# Patient Record
Sex: MALE | Race: WHITE | NOT HISPANIC OR LATINO | URBAN - METROPOLITAN AREA
[De-identification: names, ages, dates, MRNs, and addresses within clinical notes are randomized per-mention and may not be internally consistent; named-entity substitution may affect disease eponyms.]

---

## 2017-08-14 ENCOUNTER — APPOINTMENT (RX ONLY)
Dept: URBAN - METROPOLITAN AREA CLINIC 17 | Facility: CLINIC | Age: 47
Setting detail: DERMATOLOGY
End: 2017-08-14

## 2017-08-14 DIAGNOSIS — L40.0 PSORIASIS VULGARIS: ICD-10-CM | Status: STABLE

## 2017-08-14 PROCEDURE — 99213 OFFICE O/P EST LOW 20 MIN: CPT

## 2017-08-14 PROCEDURE — ? COUNSELING

## 2017-08-14 PROCEDURE — ? TREATMENT REGIMEN

## 2017-08-14 PROCEDURE — ? PRESCRIPTION

## 2017-08-14 RX ORDER — HYDROCORTISONE BUTYRATE 1 MG/G
1 OINTMENT TOPICAL BID
Qty: 1 | Refills: 2 | Status: ERX | COMMUNITY
Start: 2017-08-14

## 2017-08-14 RX ORDER — CLOBETASOL PROPIONATE 0.5 MG/G
1 OINTMENT TOPICAL BID
Qty: 1 | Refills: 6 | Status: ERX

## 2017-08-14 RX ORDER — CLOBETASOL PROPIONATE 0.5 MG/ML
1 SOLUTION TOPICAL BID
Qty: 1 | Refills: 6 | Status: ERX

## 2017-08-14 RX ADMIN — HYDROCORTISONE BUTYRATE 1: 1 OINTMENT TOPICAL at 00:00

## 2017-08-14 ASSESSMENT — BSA PSORIASIS: % BODY COVERED IN PSORIASIS: 2

## 2017-08-14 ASSESSMENT — LOCATION ZONE DERM: LOCATION ZONE: ARM

## 2017-08-14 ASSESSMENT — LOCATION DETAILED DESCRIPTION DERM
LOCATION DETAILED: RIGHT ELBOW
LOCATION DETAILED: LEFT ELBOW

## 2017-08-14 ASSESSMENT — LOCATION SIMPLE DESCRIPTION DERM
LOCATION SIMPLE: LEFT ELBOW
LOCATION SIMPLE: RIGHT ELBOW

## 2017-08-14 NOTE — PROCEDURE: TREATMENT REGIMEN
Detail Level: Zone
Action 3: Continue
Continue Regimen: Clobetasol solution for scalp flares and ointment for body flares. Hydrocortisone if needed for axilla

## 2018-12-11 ENCOUNTER — APPOINTMENT (RX ONLY)
Dept: URBAN - METROPOLITAN AREA CLINIC 41 | Facility: CLINIC | Age: 48
Setting detail: DERMATOLOGY
End: 2018-12-11

## 2018-12-11 DIAGNOSIS — L40.0 PSORIASIS VULGARIS: ICD-10-CM | Status: STABLE

## 2018-12-11 DIAGNOSIS — Z71.89 OTHER SPECIFIED COUNSELING: ICD-10-CM

## 2018-12-11 PROCEDURE — ? PRESCRIPTION

## 2018-12-11 PROCEDURE — ? COUNSELING

## 2018-12-11 PROCEDURE — ? TREATMENT REGIMEN

## 2018-12-11 PROCEDURE — 99213 OFFICE O/P EST LOW 20 MIN: CPT

## 2018-12-11 RX ORDER — CLOBETASOL PROPIONATE 0.5 MG/G
1 OINTMENT TOPICAL BID
Qty: 1 | Refills: 6 | Status: ERX

## 2018-12-11 RX ORDER — HYDROCORTISONE BUTYRATE 1 MG/G
1 OINTMENT TOPICAL BID
Qty: 1 | Refills: 2 | Status: ERX

## 2018-12-11 RX ORDER — CLOBETASOL PROPIONATE 0.5 MG/ML
1 SOLUTION TOPICAL BID
Qty: 1 | Refills: 6 | Status: ERX

## 2018-12-11 ASSESSMENT — LOCATION ZONE DERM
LOCATION ZONE: HAND
LOCATION ZONE: SCALP
LOCATION ZONE: ARM

## 2018-12-11 ASSESSMENT — LOCATION DETAILED DESCRIPTION DERM
LOCATION DETAILED: LEFT SUPERIOR PARIETAL SCALP
LOCATION DETAILED: RIGHT RADIAL PALM
LOCATION DETAILED: LEFT THENAR EMINENCE
LOCATION DETAILED: LEFT ELBOW
LOCATION DETAILED: RIGHT ELBOW

## 2018-12-11 ASSESSMENT — LOCATION SIMPLE DESCRIPTION DERM
LOCATION SIMPLE: SCALP
LOCATION SIMPLE: LEFT HAND
LOCATION SIMPLE: LEFT ELBOW
LOCATION SIMPLE: RIGHT ELBOW
LOCATION SIMPLE: RIGHT HAND

## 2018-12-11 NOTE — PROCEDURE: TREATMENT REGIMEN
Action 3: Continue
Continue Regimen: Clobetasol solution for scalp flares and ointment for body flares. Hydrocortisone if needed for axilla
Detail Level: Zone
Detail Level: Simple
Otc Regimen: Cetaphil, CeraVe or Gold Bond cream for eczema

## 2018-12-11 NOTE — HPI: RASH
What Type Of Note Output Would You Prefer (Optional)?: Standard Output
How Severe Is Your Rash?: moderate
Is This A New Presentation, Or A Follow-Up?: Rash
Additional History: Patient states he is here for follow up of his psoriasis on his scalp and right forearm, present for several years and moderate in severity.

## 2020-01-09 ENCOUNTER — RX ONLY (OUTPATIENT)
Age: 50
Setting detail: RX ONLY
End: 2020-01-09

## 2020-01-09 RX ORDER — CLOBETASOL PROPIONATE 0.5 MG/ML
1 SOLUTION TOPICAL BID
Qty: 1 | Refills: 0 | Status: ERX | COMMUNITY
Start: 2020-01-09

## 2020-10-09 ENCOUNTER — RX ONLY (OUTPATIENT)
Age: 50
Setting detail: RX ONLY
End: 2020-10-09

## 2020-10-09 RX ORDER — CLOBETASOL PROPIONATE 0.5 MG/G
1 OINTMENT TOPICAL BID
Qty: 1 | Refills: 0 | Status: ERX | COMMUNITY
Start: 2020-10-09

## 2020-10-09 RX ORDER — CLOBETASOL PROPIONATE 0.5 MG/ML
1 SOLUTION TOPICAL BID
Qty: 1 | Refills: 0 | Status: ERX

## 2020-11-24 ENCOUNTER — APPOINTMENT (RX ONLY)
Dept: URBAN - METROPOLITAN AREA CLINIC 17 | Facility: CLINIC | Age: 50
Setting detail: DERMATOLOGY
End: 2020-11-24

## 2020-11-24 VITALS — TEMPERATURE: 96.3 F

## 2020-11-24 DIAGNOSIS — L40.0 PSORIASIS VULGARIS: ICD-10-CM

## 2020-11-24 PROCEDURE — ? COUNSELING

## 2020-11-24 PROCEDURE — 99213 OFFICE O/P EST LOW 20 MIN: CPT

## 2020-11-24 PROCEDURE — ? PRESCRIPTION

## 2020-11-24 RX ORDER — CLOBETASOL PROPIONATE 0.5 MG/ML
SOLUTION TOPICAL QD
Qty: 1 | Refills: 6 | Status: ERX

## 2020-11-24 RX ORDER — CLOBETASOL PROPIONATE 0.5 MG/G
OINTMENT TOPICAL BID
Qty: 1 | Refills: 4 | Status: ERX

## 2020-11-24 RX ORDER — HYDROCORTISONE 10 MG/G
OINTMENT TOPICAL BID
Qty: 1 | Refills: 2 | Status: ERX | COMMUNITY
Start: 2020-11-24

## 2020-11-24 RX ADMIN — HYDROCORTISONE 1: 10 OINTMENT TOPICAL at 00:00

## 2020-11-24 ASSESSMENT — LOCATION DETAILED DESCRIPTION DERM
LOCATION DETAILED: LEFT PROXIMAL DORSAL FOREARM
LOCATION DETAILED: RIGHT KNEE
LOCATION DETAILED: RIGHT PROXIMAL DORSAL FOREARM
LOCATION DETAILED: LEFT KNEE

## 2020-11-24 ASSESSMENT — LOCATION ZONE DERM
LOCATION ZONE: LEG
LOCATION ZONE: ARM

## 2020-11-24 ASSESSMENT — LOCATION SIMPLE DESCRIPTION DERM
LOCATION SIMPLE: LEFT KNEE
LOCATION SIMPLE: LEFT FOREARM
LOCATION SIMPLE: RIGHT FOREARM
LOCATION SIMPLE: RIGHT KNEE

## 2020-11-25 ENCOUNTER — RX ONLY (OUTPATIENT)
Age: 50
Setting detail: RX ONLY
End: 2020-11-25

## 2020-11-25 RX ORDER — HYDROCORTISONE 10 MG/G
OINTMENT TOPICAL BID
Qty: 1 | Refills: 2 | Status: ERX

## 2021-07-07 ENCOUNTER — RX ONLY (OUTPATIENT)
Age: 51
Setting detail: RX ONLY
End: 2021-07-07

## 2021-07-07 RX ORDER — CLOBETASOL PROPIONATE 0.5 MG/ML
SOLUTION TOPICAL QD
Qty: 1 | Refills: 2 | Status: ERX

## 2021-07-07 RX ORDER — CLOBETASOL PROPIONATE 0.5 MG/G
OINTMENT TOPICAL BID
Qty: 1 | Refills: 2 | Status: ERX

## 2021-07-07 RX ORDER — HYDROCORTISONE 10 MG/G
OINTMENT TOPICAL BID
Qty: 1 | Refills: 2 | Status: ERX

## 2022-03-17 ENCOUNTER — RX ONLY (OUTPATIENT)
Age: 52
Setting detail: RX ONLY
End: 2022-03-17

## 2022-03-17 RX ORDER — CLOBETASOL PROPIONATE 0.5 MG/ML
1 SOLUTION TOPICAL AS DIRECTED
Qty: 50 | Refills: 3 | Status: ERX | COMMUNITY
Start: 2022-03-17

## 2022-11-08 ENCOUNTER — APPOINTMENT (RX ONLY)
Dept: URBAN - METROPOLITAN AREA CLINIC 17 | Facility: CLINIC | Age: 52
Setting detail: DERMATOLOGY
End: 2022-11-08

## 2022-11-08 DIAGNOSIS — L40.0 PSORIASIS VULGARIS: ICD-10-CM | Status: WELL CONTROLLED

## 2022-11-08 DIAGNOSIS — L44.8 OTHER SPECIFIED PAPULOSQUAMOUS DISORDERS: ICD-10-CM | Status: INADEQUATELY CONTROLLED

## 2022-11-08 PROCEDURE — ? TREATMENT REGIMEN

## 2022-11-08 PROCEDURE — 99214 OFFICE O/P EST MOD 30 MIN: CPT

## 2022-11-08 PROCEDURE — ? PRESCRIPTION

## 2022-11-08 PROCEDURE — ? COUNSELING

## 2022-11-08 RX ORDER — HYDROCORTISONE 1 %
1 OINTMENT (GRAM) TOPICAL QD
Qty: 30 | Refills: 3 | Status: ERX | COMMUNITY
Start: 2022-11-08

## 2022-11-08 RX ORDER — FLUCONAZOLE 150 MG/1
1 TABLET ORAL QD
Qty: 3 | Refills: 0 | Status: ERX | COMMUNITY
Start: 2022-11-08

## 2022-11-08 RX ORDER — CLOBETASOL PROPIONATE 0.5 MG/ML
1 SOLUTION TOPICAL QHS
Qty: 50 | Refills: 11 | Status: ERX

## 2022-11-08 RX ORDER — CLOBETASOL PROPIONATE 0.5 MG/G
1 OINTMENT TOPICAL BID
Qty: 60 | Refills: 6 | Status: ERX | COMMUNITY
Start: 2022-11-08

## 2022-11-08 RX ADMIN — CLOBETASOL PROPIONATE 1: 0.5 OINTMENT TOPICAL at 00:00

## 2022-11-08 RX ADMIN — FLUCONAZOLE 1: 150 TABLET ORAL at 00:00

## 2022-11-08 RX ADMIN — Medication 1: at 00:00

## 2022-11-08 ASSESSMENT — LOCATION ZONE DERM
LOCATION ZONE: ARM
LOCATION ZONE: LEG
LOCATION ZONE: SCALP

## 2022-11-08 ASSESSMENT — LOCATION DETAILED DESCRIPTION DERM
LOCATION DETAILED: RIGHT ELBOW
LOCATION DETAILED: LEFT ANTERIOR LATERAL PROXIMAL THIGH
LOCATION DETAILED: RIGHT ANTERIOR PROXIMAL THIGH
LOCATION DETAILED: LEFT SUPERIOR PARIETAL SCALP
LOCATION DETAILED: LEFT ELBOW
LOCATION DETAILED: POSTERIOR MID-PARIETAL SCALP

## 2022-11-08 ASSESSMENT — LOCATION SIMPLE DESCRIPTION DERM
LOCATION SIMPLE: POSTERIOR SCALP
LOCATION SIMPLE: LEFT ELBOW
LOCATION SIMPLE: LEFT THIGH
LOCATION SIMPLE: SCALP
LOCATION SIMPLE: RIGHT ELBOW
LOCATION SIMPLE: RIGHT THIGH

## 2024-02-23 ENCOUNTER — APPOINTMENT (RX ONLY)
Dept: URBAN - METROPOLITAN AREA CLINIC 17 | Facility: CLINIC | Age: 54
Setting detail: DERMATOLOGY
End: 2024-02-23

## 2024-02-23 DIAGNOSIS — L40.0 PSORIASIS VULGARIS: ICD-10-CM

## 2024-02-23 PROCEDURE — ? PRESCRIPTION SAMPLES PROVIDED

## 2024-02-23 PROCEDURE — ? COUNSELING

## 2024-02-23 PROCEDURE — ? SEPARATE AND IDENTIFIABLE DOCUMENTATION

## 2024-02-23 PROCEDURE — 99214 OFFICE O/P EST MOD 30 MIN: CPT

## 2024-02-23 ASSESSMENT — LOCATION DETAILED DESCRIPTION DERM
LOCATION DETAILED: RIGHT ELBOW
LOCATION DETAILED: LEFT ANTERIOR LATERAL PROXIMAL THIGH
LOCATION DETAILED: LEFT SUPERIOR PARIETAL SCALP
LOCATION DETAILED: LEFT ELBOW
LOCATION DETAILED: RIGHT ANTERIOR PROXIMAL THIGH

## 2024-02-23 ASSESSMENT — LOCATION ZONE DERM
LOCATION ZONE: ARM
LOCATION ZONE: SCALP
LOCATION ZONE: LEG

## 2024-02-23 ASSESSMENT — LOCATION SIMPLE DESCRIPTION DERM
LOCATION SIMPLE: RIGHT ELBOW
LOCATION SIMPLE: RIGHT THIGH
LOCATION SIMPLE: SCALP
LOCATION SIMPLE: LEFT THIGH
LOCATION SIMPLE: LEFT ELBOW

## 2024-02-23 NOTE — PROCEDURE: PRESCRIPTION SAMPLES PROVIDED
Expiration Date (Optional): 05/31/2024
Samples Given: Otezla starter pack
Lot/Batch Number (Optional): [id number]
Detail Level: Generalized

## 2024-03-22 ENCOUNTER — APPOINTMENT (RX ONLY)
Dept: URBAN - METROPOLITAN AREA CLINIC 17 | Facility: CLINIC | Age: 54
Setting detail: DERMATOLOGY
End: 2024-03-22

## 2024-03-22 DIAGNOSIS — L40.0 PSORIASIS VULGARIS: ICD-10-CM | Status: STABLE

## 2024-03-22 PROCEDURE — ? PRESCRIPTION SAMPLES PROVIDED

## 2024-03-22 PROCEDURE — 99213 OFFICE O/P EST LOW 20 MIN: CPT

## 2024-03-22 PROCEDURE — ? COUNSELING

## 2024-03-22 PROCEDURE — ? TREATMENT REGIMEN

## 2024-03-22 ASSESSMENT — LOCATION DETAILED DESCRIPTION DERM
LOCATION DETAILED: LEFT SUPERIOR PARIETAL SCALP
LOCATION DETAILED: RIGHT ELBOW
LOCATION DETAILED: LEFT ANTERIOR LATERAL PROXIMAL THIGH
LOCATION DETAILED: RIGHT ANTERIOR PROXIMAL THIGH
LOCATION DETAILED: LEFT ELBOW

## 2024-03-22 ASSESSMENT — LOCATION SIMPLE DESCRIPTION DERM
LOCATION SIMPLE: SCALP
LOCATION SIMPLE: RIGHT ELBOW
LOCATION SIMPLE: LEFT ELBOW
LOCATION SIMPLE: LEFT THIGH
LOCATION SIMPLE: RIGHT THIGH

## 2024-03-22 ASSESSMENT — LOCATION ZONE DERM
LOCATION ZONE: LEG
LOCATION ZONE: SCALP
LOCATION ZONE: ARM

## 2024-03-22 ASSESSMENT — BSA PSORIASIS: % BODY COVERED IN PSORIASIS: 10

## 2024-03-22 ASSESSMENT — PGA PSORIASIS: PGA PSORIASIS 2020: ALMOST CLEAR

## 2024-03-22 ASSESSMENT — ITCH NUMERIC RATING SCALE: HOW SEVERE IS YOUR ITCHING?: 3

## 2024-03-22 NOTE — PROCEDURE: PRESCRIPTION SAMPLES PROVIDED
Expiration Date (Optional): 01/31/25
Samples Given: Otezla starter pack x3
Lot/Batch Number (Optional): [id number]
Detail Level: Generalized

## 2024-03-22 NOTE — PROCEDURE: TREATMENT REGIMEN
Detail Level: Zone
Action 2: Continue
Plan: Patients psoriasis has been stable. Will follow up in a month for reevaluation of his psoriasis.

## 2024-03-22 NOTE — HPI: PSORIASIS (PATIENT REPORTED)
Additional History: Patient states his psoriasis has improved since starting otezla. He noticed his flaring and itch has gone down.

## 2024-07-29 ENCOUNTER — RX ONLY (OUTPATIENT)
Age: 54
Setting detail: RX ONLY
End: 2024-07-29

## 2024-07-29 RX ORDER — APREMILAST 30 MG/1
1 TABLET, FILM COATED ORAL
Qty: 60 | Refills: 11 | Status: ERX | COMMUNITY
Start: 2024-07-29

## 2024-08-13 ENCOUNTER — RX ONLY (OUTPATIENT)
Age: 54
Setting detail: RX ONLY
End: 2024-08-13

## 2024-08-13 RX ORDER — APREMILAST 30 MG/1
1 TABLET, FILM COATED ORAL
Qty: 60 | Refills: 11 | Status: ERX

## 2025-08-19 ENCOUNTER — APPOINTMENT (OUTPATIENT)
Dept: URBAN - METROPOLITAN AREA CLINIC 17 | Facility: CLINIC | Age: 55
Setting detail: DERMATOLOGY
End: 2025-08-19

## 2025-08-19 DIAGNOSIS — L40.0 PSORIASIS VULGARIS: ICD-10-CM | Status: INADEQUATELY CONTROLLED

## 2025-08-19 PROCEDURE — ? TREATMENT REGIMEN

## 2025-08-19 PROCEDURE — ? SOTYKTU INITIATION

## 2025-08-19 PROCEDURE — ? COUNSELING

## 2025-08-19 PROCEDURE — ? PRESCRIPTION

## 2025-08-19 RX ORDER — CLOBETASOL PROPIONATE 0.5 MG/G
1 OINTMENT TOPICAL BID
Qty: 60 | Refills: 6 | Status: ERX

## 2025-08-19 RX ORDER — DEUCRAVACITINIB 6 MG/1
1 TABLET, FILM COATED ORAL QD
Qty: 30 | Refills: 0 | Status: ERX | COMMUNITY
Start: 2025-08-19

## 2025-08-19 RX ORDER — CLOBETASOL PROPIONATE 0.5 MG/ML
1 SOLUTION TOPICAL BID
Qty: 50 | Refills: 11 | Status: ERX

## 2025-08-19 RX ADMIN — DEUCRAVACITINIB 1: 6 TABLET, FILM COATED ORAL at 00:00

## 2025-08-19 ASSESSMENT — LOCATION DETAILED DESCRIPTION DERM
LOCATION DETAILED: RIGHT PROXIMAL DORSAL FOREARM
LOCATION DETAILED: MID-FRONTAL SCALP
LOCATION DETAILED: LEFT PROXIMAL DORSAL FOREARM
LOCATION DETAILED: LEFT KNEE

## 2025-08-19 ASSESSMENT — LOCATION SIMPLE DESCRIPTION DERM
LOCATION SIMPLE: RIGHT FOREARM
LOCATION SIMPLE: LEFT KNEE
LOCATION SIMPLE: LEFT FOREARM
LOCATION SIMPLE: ANTERIOR SCALP

## 2025-08-19 ASSESSMENT — LOCATION ZONE DERM
LOCATION ZONE: ARM
LOCATION ZONE: LEG
LOCATION ZONE: SCALP

## 2025-08-19 ASSESSMENT — BSA PSORIASIS: % BODY COVERED IN PSORIASIS: 16

## 2025-08-19 ASSESSMENT — ITCH NUMERIC RATING SCALE: HOW SEVERE IS YOUR ITCHING?: 7

## 2025-08-20 ENCOUNTER — RX ONLY (RX ONLY)
Age: 55
End: 2025-08-20

## 2025-08-20 RX ORDER — DEUCRAVACITINIB 6 MG/1
1 TABLET, FILM COATED ORAL QD
Qty: 30 | Refills: 6 | Status: ERX